# Patient Record
Sex: MALE | Race: WHITE | URBAN - METROPOLITAN AREA
[De-identification: names, ages, dates, MRNs, and addresses within clinical notes are randomized per-mention and may not be internally consistent; named-entity substitution may affect disease eponyms.]

---
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
## 2021-06-26 ENCOUNTER — WALK IN (OUTPATIENT)
Dept: URGENT CARE | Age: 21
End: 2021-06-26

## 2021-06-26 VITALS
BODY MASS INDEX: 23.85 KG/M2 | WEIGHT: 202 LBS | TEMPERATURE: 97.8 F | HEIGHT: 77 IN | OXYGEN SATURATION: 98 % | SYSTOLIC BLOOD PRESSURE: 118 MMHG | DIASTOLIC BLOOD PRESSURE: 72 MMHG | HEART RATE: 77 BPM | RESPIRATION RATE: 14 BRPM

## 2021-06-26 DIAGNOSIS — Z02.5 SPORTS PHYSICAL: Primary | ICD-10-CM

## 2021-06-26 PROCEDURE — X0944 SELF PAY APN OR PA PERFORMED SPORTS PHYSICAL: HCPCS | Performed by: NURSE PRACTITIONER

## 2021-06-26 ASSESSMENT — ENCOUNTER SYMPTOMS
CONSTITUTIONAL NEGATIVE: 1
PSYCHIATRIC NEGATIVE: 1
NEUROLOGICAL NEGATIVE: 1
RESPIRATORY NEGATIVE: 1
GASTROINTESTINAL NEGATIVE: 1
ALLERGIC/IMMUNOLOGIC NEGATIVE: 1
EYES NEGATIVE: 1
ENDOCRINE NEGATIVE: 1
HEMATOLOGIC/LYMPHATIC NEGATIVE: 1

## 2021-06-27 ENCOUNTER — APPOINTMENT (OUTPATIENT)
Dept: URGENT CARE | Age: 21
End: 2021-06-27

## 2021-08-04 ENCOUNTER — OFFICE VISIT (OUTPATIENT)
Dept: FAMILY MEDICINE CLINIC | Facility: CLINIC | Age: 21
End: 2021-08-04
Payer: COMMERCIAL

## 2021-08-04 VITALS
TEMPERATURE: 98 F | DIASTOLIC BLOOD PRESSURE: 70 MMHG | HEART RATE: 84 BPM | SYSTOLIC BLOOD PRESSURE: 118 MMHG | OXYGEN SATURATION: 99 %

## 2021-08-04 DIAGNOSIS — J32.9 RHINOSINUSITIS: Primary | ICD-10-CM

## 2021-08-04 DIAGNOSIS — R05.9 COUGH: ICD-10-CM

## 2021-08-04 DIAGNOSIS — J31.0 RHINOSINUSITIS: Primary | ICD-10-CM

## 2021-08-04 PROCEDURE — 3078F DIAST BP <80 MM HG: CPT | Performed by: NURSE PRACTITIONER

## 2021-08-04 PROCEDURE — 3074F SYST BP LT 130 MM HG: CPT | Performed by: NURSE PRACTITIONER

## 2021-08-04 PROCEDURE — 99213 OFFICE O/P EST LOW 20 MIN: CPT | Performed by: NURSE PRACTITIONER

## 2021-08-04 RX ORDER — AZITHROMYCIN 250 MG/1
TABLET, FILM COATED ORAL
Qty: 6 TABLET | Refills: 0 | Status: SHIPPED | OUTPATIENT
Start: 2021-08-04 | End: 2021-08-09

## 2021-08-04 NOTE — PATIENT INSTRUCTIONS
Mucinex- dm   Azithromycin  Lots of water at least 64oz   Rest, and continue to monitor your symptoms  If covid test is negative and symptoms persist call the office

## 2021-08-04 NOTE — PROGRESS NOTES
HPI/Subjective:   Patient ID: Ramirez Pruett is a 24year old male. Patient presents to the clinic today for evaluation of several complaints. States that his symptoms came on quick and abbrupt. Was not feeling well as few days ago but worsened today. oriented to person, place, and time.          Assessment & Plan:   Rhinosinusitis  (primary encounter diagnosis)  Cough    Orders Placed This Encounter      COVID-19 Testing by PCR ([facility]) [E]    Patient seen in office today for evaluation of several comp

## 2021-08-06 ENCOUNTER — TELEPHONE (OUTPATIENT)
Dept: FAMILY MEDICINE CLINIC | Facility: CLINIC | Age: 21
End: 2021-08-06

## 2021-08-06 LAB — SARS-COV-2 RNA RESP QL NAA+PROBE: NOT DETECTED

## 2021-08-06 NOTE — TELEPHONE ENCOUNTER
----- Message from MYLA Whitney sent at 8/6/2021 12:54 PM CDT -----  Results reviewedPlease let patient know Covid test is negative. This is great news. Continue antibiotic as prescribed. Continue symptomatic treatment at home.  Continue monitori

## 2022-06-02 ENCOUNTER — TELEPHONE (OUTPATIENT)
Dept: SCHEDULING | Age: 22
End: 2022-06-02

## 2022-06-10 ENCOUNTER — WALK IN (OUTPATIENT)
Dept: URGENT CARE | Age: 22
End: 2022-06-10

## 2022-06-10 VITALS
TEMPERATURE: 97.2 F | OXYGEN SATURATION: 97 % | HEIGHT: 75 IN | SYSTOLIC BLOOD PRESSURE: 118 MMHG | BODY MASS INDEX: 25.27 KG/M2 | HEART RATE: 62 BPM | WEIGHT: 203.2 LBS | RESPIRATION RATE: 18 BRPM | DIASTOLIC BLOOD PRESSURE: 68 MMHG

## 2022-06-10 DIAGNOSIS — Z02.5 SPORTS PHYSICAL: Primary | ICD-10-CM

## 2022-06-10 PROCEDURE — X0944 SELF PAY APN OR PA PERFORMED SPORTS PHYSICAL: HCPCS | Performed by: NURSE PRACTITIONER

## 2022-06-10 ASSESSMENT — ENCOUNTER SYMPTOMS
SHORTNESS OF BREATH: 0
NAUSEA: 0
SORE THROAT: 0
WHEEZING: 0
APPETITE CHANGE: 0
ABDOMINAL PAIN: 0
FEVER: 0
NEUROLOGICAL NEGATIVE: 1
FATIGUE: 0
SINUS PAIN: 0
RHINORRHEA: 1
VOMITING: 0
COUGH: 0
DIARRHEA: 0
CHILLS: 0
SINUS PRESSURE: 0
BLOOD IN STOOL: 0

## 2022-06-10 ASSESSMENT — VISUAL ACUITY: OU: 1

## 2023-07-24 ENCOUNTER — WALK IN (OUTPATIENT)
Dept: URGENT CARE | Age: 23
End: 2023-07-24

## 2023-07-24 VITALS
HEIGHT: 75 IN | OXYGEN SATURATION: 99 % | BODY MASS INDEX: 24.48 KG/M2 | TEMPERATURE: 97.1 F | SYSTOLIC BLOOD PRESSURE: 100 MMHG | DIASTOLIC BLOOD PRESSURE: 74 MMHG | WEIGHT: 196.87 LBS | HEART RATE: 77 BPM | RESPIRATION RATE: 18 BRPM

## 2023-07-24 DIAGNOSIS — H61.23 BILATERAL IMPACTED CERUMEN: ICD-10-CM

## 2023-07-24 DIAGNOSIS — Z02.5 SPORTS PHYSICAL: Primary | ICD-10-CM

## 2023-07-24 PROBLEM — Z51.89 ENCOUNTER FOR OTHER SPECIFIED AFTERCARE: Status: ACTIVE | Noted: 2019-09-11

## 2023-07-24 PROBLEM — S62.502A CLOSED NONDISPLACED FRACTURE OF PHALANX OF LEFT THUMB: Status: ACTIVE | Noted: 2018-04-30

## 2023-07-24 PROBLEM — S63.279A CLOSED DISLOCATION OF INTERPHALANGEAL (JOINT), HAND: Status: ACTIVE | Noted: 2019-02-18

## 2023-07-24 PROBLEM — B27.90 MONONUCLEOSIS: Status: ACTIVE | Noted: 2018-11-29

## 2023-07-24 PROBLEM — S43.50XA ACROMIOCLAVICULAR SPRAIN: Status: ACTIVE | Noted: 2019-08-31

## 2023-07-24 PROCEDURE — X99429 ACW PHYSICAL EXAM: Performed by: NURSE PRACTITIONER

## 2023-07-28 ENCOUNTER — WALK IN (OUTPATIENT)
Dept: URGENT CARE | Age: 23
End: 2023-07-28

## 2023-07-28 VITALS
HEART RATE: 99 BPM | TEMPERATURE: 97.9 F | SYSTOLIC BLOOD PRESSURE: 108 MMHG | DIASTOLIC BLOOD PRESSURE: 70 MMHG | RESPIRATION RATE: 18 BRPM | OXYGEN SATURATION: 99 %

## 2023-07-28 DIAGNOSIS — H61.23 BILATERAL IMPACTED CERUMEN: Primary | ICD-10-CM

## 2024-08-16 ENCOUNTER — HOSPITAL ENCOUNTER (EMERGENCY)
Age: 24
Discharge: HOME | End: 2024-08-16

## 2024-08-16 ENCOUNTER — APPOINTMENT (OUTPATIENT)
Dept: GENERAL RADIOLOGY | Age: 24
End: 2024-08-16

## 2024-08-16 VITALS
RESPIRATION RATE: 17 BRPM | TEMPERATURE: 98.1 F | OXYGEN SATURATION: 99 % | BODY MASS INDEX: 26.07 KG/M2 | DIASTOLIC BLOOD PRESSURE: 62 MMHG | HEART RATE: 58 BPM | SYSTOLIC BLOOD PRESSURE: 96 MMHG | WEIGHT: 210 LBS

## 2024-08-16 DIAGNOSIS — S61.011A LACERATION OF RIGHT THUMB WITHOUT FOREIGN BODY WITHOUT DAMAGE TO NAIL, INITIAL ENCOUNTER: Primary | ICD-10-CM

## 2024-08-16 PROCEDURE — 90471 IMMUNIZATION ADMIN: CPT

## 2024-08-16 PROCEDURE — 12001 RPR S/N/AX/GEN/TRNK 2.5CM/<: CPT

## 2024-08-16 PROCEDURE — 90715 TDAP VACCINE 7 YRS/> IM: CPT

## 2024-08-16 PROCEDURE — 99282 EMERGENCY DEPT VISIT SF MDM: CPT

## 2024-08-16 PROCEDURE — 10002800 HB RX 250 W HCPCS

## 2024-08-16 PROCEDURE — 73130 X-RAY EXAM OF HAND: CPT

## 2024-08-16 PROCEDURE — 99283 EMERGENCY DEPT VISIT LOW MDM: CPT

## 2024-08-16 RX ADMIN — TETANUS TOXOID, REDUCED DIPHTHERIA TOXOID AND ACELLULAR PERTUSSIS VACCINE, ADSORBED 0.5 ML: 5; 2.5; 8; 8; 2.5 SUSPENSION INTRAMUSCULAR at 16:05

## 2024-08-16 SDOH — SOCIAL STABILITY: SOCIAL INSECURITY: HOW OFTEN DOES ANYONE, INCLUDING FAMILY AND FRIENDS, INSULT OR TALK DOWN TO YOU?: NEVER

## 2024-08-16 SDOH — SOCIAL STABILITY: SOCIAL INSECURITY: HOW OFTEN DOES ANYONE, INCLUDING FAMILY AND FRIENDS, PHYSICALLY HURT YOU?: NEVER

## 2024-08-16 SDOH — SOCIAL STABILITY: SOCIAL INSECURITY: HOW OFTEN DOES ANYONE, INCLUDING FAMILY AND FRIENDS, THREATEN YOU WITH HARM?: NEVER

## 2024-08-16 SDOH — SOCIAL STABILITY: SOCIAL INSECURITY: HOW OFTEN DOES ANYONE, INCLUDING FAMILY AND FRIENDS, SCREAM OR CURSE AT YOU?: NEVER

## 2024-08-18 ASSESSMENT — ENCOUNTER SYMPTOMS
COLOR CHANGE: 0
CHILLS: 0
AGITATION: 0
NUMBNESS: 0
SHORTNESS OF BREATH: 0
FEVER: 0
WOUND: 1

## (undated) NOTE — LETTER
Date: 2/20/2018    Patient Name: Charisse Keller          To Whom it may concern: This letter has been written at the patient's request. The above patient was seen at the [facility] for treatment of a medical condition.     The patient may

## (undated) NOTE — LETTER
Date: 11/13/2019    Patient Name: Fabiola Hinojosa          To Whom it may concern: This letter has been written at the patient's request. The above patient was seen at the [facility] for treatment of a medical condition.     This patient s

## (undated) NOTE — LETTER
12/10/2018              Connor Bonilla        [id number] [facility] [address]         To Whom It May Concern,    Maynor Rodriguez has been followed for his recent case of melanoma.   Based on the most recent test as of today Satya Pulse he is [employer]

## (undated) NOTE — LETTER
12/10/2018              Nadia Walton        [id number] [facility] [id number]         To Whom It May Concern,      Almita Gore has been followed for his recent case of mononucleosis.  .  Based on the most recent test as of today Champ assisted he i

## (undated) NOTE — MR AVS SNAPSHOT
[address]  [address] [address] [id number]  [phone]               Thank you for choosing us for your health care visit with Karina Hall MD.  We are glad to serve you and happy to provide you with this summary of yo Sign Up Forms link in the Additional Information box on the right. [employer] Questions? Call [phone] for help. [employer] is NOT to be used for urgent needs. For medical emergencies, dial 911.             Educational Information     Healthy Acti o Preparing foods at home as a family  o Eating a diet rich in calcium  o Eating a high fiber diet    Help your children form healthy habits. Healthy active children are more likely to be healthy active adults!              Visit [facility]

## (undated) NOTE — LETTER
12/10/2018              Delmer Sahni        [id number] [facility] [id number]         To Whom It May Concern,    Hannah Cortez has been followed for his recent case of mononucleosis.  .  Based on the most recent test as of today aJs Oshea he is

## (undated) NOTE — LETTER
11/13/2018              La Links        [id number] [facility] [address]         To Whom It May Concern,    Moshe Rubio was seen at the office on November 12 and diagnosed with mononucleosis.   Moshe Rubio will need to be out of both PE gym class

## (undated) NOTE — LETTER
Date: 1/27/2020    Patient Name: Kenn Mccormick          To Whom it may concern: This letter has been written at the patient's request. The above patient was seen at the [facility] for treatment of a medical condition.     This patient sh